# Patient Record
Sex: FEMALE | Race: WHITE | Employment: OTHER | URBAN - NONMETROPOLITAN AREA
[De-identification: names, ages, dates, MRNs, and addresses within clinical notes are randomized per-mention and may not be internally consistent; named-entity substitution may affect disease eponyms.]

---

## 2020-08-18 ENCOUNTER — APPOINTMENT (OUTPATIENT)
Dept: CT IMAGING | Age: 86
End: 2020-08-18
Payer: MEDICARE

## 2020-08-18 ENCOUNTER — HOSPITAL ENCOUNTER (EMERGENCY)
Age: 86
Discharge: HOME | End: 2020-08-18
Attending: FAMILY MEDICINE
Payer: MEDICARE

## 2020-08-18 VITALS
OXYGEN SATURATION: 97 % | SYSTOLIC BLOOD PRESSURE: 154 MMHG | RESPIRATION RATE: 20 BRPM | TEMPERATURE: 97.1 F | WEIGHT: 200 LBS | DIASTOLIC BLOOD PRESSURE: 74 MMHG | HEART RATE: 80 BPM

## 2020-08-18 LAB
ANION GAP: 8 MEQ/L (ref 8–16)
BUN BLDV-MCNC: 19 MG/DL (ref 7–18)
CHLORIDE BLD-SCNC: 102 MEQ/L (ref 98–107)
CO2: 29 MEQ/L (ref 21–32)
CREAT SERPL-MCNC: 0.8 MG/DL (ref 0.6–1.3)
GFR, ESTIMATED: 71 ML/MIN/1.73M2
GLUCOSE BLD-MCNC: 113 MG/DL (ref 74–106)
HCT VFR BLD CALC: 42.9 % (ref 37–47)
HEMOGLOBIN: 14.4 GM/DL (ref 12–16)
MCH RBC QN AUTO: 32.6 PG (ref 27–31)
MCHC RBC AUTO-ENTMCNC: 33.5 GM/DL (ref 33–37)
MCV RBC AUTO: 97.4 FL (ref 81–99)
PDW BLD-RTO: 13.7 % (ref 11.5–14.5)
PLATELET # BLD: 283 THOU/MM3 (ref 130–400)
PMV BLD AUTO: 7.9 FL (ref 7.4–10.4)
POC CALCIUM: 9.2 MG/DL (ref 8.5–10.1)
POTASSIUM SERPL-SCNC: 4.2 MEQ/L (ref 3.5–5.1)
RBC # BLD: 4.41 MILL/MM3 (ref 4.2–5.4)
SODIUM BLD-SCNC: 139 MEQ/L (ref 136–145)
WBC # BLD: 8.9 THOU/MM3 (ref 4.8–10.8)

## 2020-08-18 PROCEDURE — 2709999900 HC NON-CHARGEABLE SUPPLY

## 2020-08-18 PROCEDURE — 90471 IMMUNIZATION ADMIN: CPT | Performed by: FAMILY MEDICINE

## 2020-08-18 PROCEDURE — 85027 COMPLETE CBC AUTOMATED: CPT

## 2020-08-18 PROCEDURE — 90715 TDAP VACCINE 7 YRS/> IM: CPT | Performed by: FAMILY MEDICINE

## 2020-08-18 PROCEDURE — 72125 CT NECK SPINE W/O DYE: CPT

## 2020-08-18 PROCEDURE — 99284 EMERGENCY DEPT VISIT MOD MDM: CPT

## 2020-08-18 PROCEDURE — 80048 BASIC METABOLIC PNL TOTAL CA: CPT

## 2020-08-18 PROCEDURE — 36415 COLL VENOUS BLD VENIPUNCTURE: CPT

## 2020-08-18 PROCEDURE — 6360000002 HC RX W HCPCS: Performed by: FAMILY MEDICINE

## 2020-08-18 PROCEDURE — 12011 RPR F/E/E/N/L/M 2.5 CM/<: CPT

## 2020-08-18 PROCEDURE — 70450 CT HEAD/BRAIN W/O DYE: CPT

## 2020-08-18 PROCEDURE — 99283 EMERGENCY DEPT VISIT LOW MDM: CPT

## 2020-08-18 PROCEDURE — 70486 CT MAXILLOFACIAL W/O DYE: CPT

## 2020-08-18 RX ORDER — LISINOPRIL AND HYDROCHLOROTHIAZIDE 12.5; 1 MG/1; MG/1
1 TABLET ORAL DAILY
COMMUNITY

## 2020-08-18 RX ORDER — SIMVASTATIN 20 MG
20 TABLET ORAL NIGHTLY
COMMUNITY

## 2020-08-18 RX ORDER — LIDOCAINE HYDROCHLORIDE 10 MG/ML
INJECTION, SOLUTION EPIDURAL; INFILTRATION; INTRACAUDAL; PERINEURAL
Status: DISCONTINUED
Start: 2020-08-18 | End: 2020-08-18 | Stop reason: HOSPADM

## 2020-08-18 RX ADMIN — TETANUS TOXOID, REDUCED DIPHTHERIA TOXOID AND ACELLULAR PERTUSSIS VACCINE, ADSORBED 0.5 ML: 5; 2.5; 8; 8; 2.5 SUSPENSION INTRAMUSCULAR at 14:34

## 2020-08-18 SDOH — HEALTH STABILITY: MENTAL HEALTH: HOW OFTEN DO YOU HAVE A DRINK CONTAINING ALCOHOL?: NEVER

## 2020-08-18 ASSESSMENT — ENCOUNTER SYMPTOMS
NAUSEA: 0
ABDOMINAL PAIN: 0
FACIAL SWELLING: 1
VOMITING: 0
SHORTNESS OF BREATH: 0

## 2020-08-18 NOTE — ED PROVIDER NOTES
[facility]  eMERGENCY dEPARTMENT eNCOUnter          CHIEF COMPLAINT       Chief Complaint   Patient presents with    Fall       Nurses Notes reviewed and I agree except as noted in the HPI. HISTORY OF PRESENT ILLNESS    Layman Simmonds Vesta Lyons is a 81 y.o. female who presents after falling at home    Location/Symptom: Ozzie Feldman at home  Timing/Onset: 8/18/2020  Context/Setting: She has difficult time ambulating  Was walking at home and she thinks she was turning and fell to the floor  She did strike her face sustaining a small laceration left nose, some abrasions  She is not on blood thinners. Not on aspirin  Quality: She struck her face had soft to swelling of the right forehead, a left-sided nasal laceration, and some cutaneous bruises  She had abrasions both knees but no significant knee injury  She has chronic knee arthritis  She had no neck pain or LOC  Duration: Over the last few hours  Modifying Factors: She was able to call for help to get up  Severity: 5/10    REVIEW OF SYSTEMS     Review of Systems   Constitutional: Negative for diaphoresis. HENT: Positive for facial swelling. Negative for nosebleeds. Eyes: Negative for visual disturbance. Respiratory: Negative for shortness of breath. Cardiovascular: Negative for chest pain and palpitations. Gastrointestinal: Negative for abdominal pain, nausea and vomiting. Genitourinary: Negative for flank pain. Musculoskeletal: Negative for neck pain. She did scrape both knees. Has chronic knee arthritis right greater than left but does not feel any worse than before the fall    No low back pain   Skin: Positive for wound. Left nasal laceration   Neurological: Negative for speech difficulty, weakness, numbness and headaches. Hematological: Does not bruise/bleed easily. Psychiatric/Behavioral: Negative for confusion. PAST MEDICAL HISTORY    has no past medical history on file.     SURGICAL HISTORY      has no past surgical history on file. CURRENT MEDICATIONS       Previous Medications    LISINOPRIL-HYDROCHLOROTHIAZIDE (PRINZIDE;ZESTORETIC) 10-12.5 MG PER TABLET    Take 1 tablet by mouth daily    SIMVASTATIN (ZOCOR) 20 MG TABLET    Take 20 mg by mouth nightly       ALLERGIES     has No Known Allergies. FAMILY HISTORY     has no family status information on file. family history is not on file. SOCIAL HISTORY      reports that she has never smoked. She has never used smokeless tobacco. She reports that she does not drink alcohol. PHYSICAL EXAM     INITIAL VITALS:  weight is 200 lb (90.7 kg). Her temporal temperature is 97.1 °F (36.2 °C). Her blood pressure is 154/74 (abnormal) and her pulse is 80. Her respiration is 20 and oxygen saturation is 97%. Physical Exam  Vitals signs and nursing note reviewed. Constitutional:       Comments: GCS 15   HENT:      Head: Normocephalic. Comments: Slight soft to swelling right forehead medial eyebrow region    There is a laceration of the left lateral nasal bone 1 cm    Nose is normally aligned    Mouth lips normal  Eyes:      Extraocular Movements: Extraocular movements intact. Pupils: Pupils are equal, round, and reactive to light. Neck:      Musculoskeletal: Normal range of motion and neck supple. No muscular tenderness. Comments: Not tender to palpate the neck  Cardiovascular:      Rate and Rhythm: Normal rate and regular rhythm. Pulses: Normal pulses. Heart sounds: Normal heart sounds. Pulmonary:      Effort: Pulmonary effort is normal.      Breath sounds: Normal breath sounds. Comments: Not tender over the sternum clavicles or ribs  Chest:      Chest wall: No tenderness. Abdominal:      Palpations: Abdomen is soft. Tenderness: There is no abdominal tenderness. There is no guarding or rebound.    Musculoskeletal:      Comments: She has some abrasions both knees but no direct tenderness no soft tissue swelling or effusions    No tenderness of the upper extremities to palpate     Skin:     General: Skin is warm and dry. Comments: 1 cm laceration over the left nasal bone region. Some very superficial epidermal abrasions both knees    Few bruises on the right arm diffusely with no tenderness     Neurological:      General: No focal deficit present. Mental Status: She is alert. Motor: No weakness. Psychiatric:         Mood and Affect: Mood normal.         Behavior: Behavior normal.             DIFFERENTIAL DIAGNOSIS:     Fall with facial contusions evaluate for fracture    Left face/nasal laceration        DIAGNOSTIC RESULTS         RADIOLOGY: non-plain film images(s) such as CT, Ultrasound and MRI are read by the radiologist.  The patient had a multiple view CT scan of the cervical spine which demonstrates  degenerative changes but no acute fracture. CT scan of the facial bones showed nondisplaced right nasal fracture. Soft tissue swelling but no other acute process    CT of the head showed no acute intracranial bleeding or skull fracture.       [x] Visualized and interpreted by me   [x] Radiologist's Wet Read Report Reviewed   [] Discussed with Radiologist.    LABS:   Labs Reviewed   CBC - Abnormal; Notable for the following components:       Result Value    MCH 32.6 (*)     All other components within normal limits   BASIC METABOLIC PANEL - Abnormal; Notable for the following components:    Glucose 113 (*)     BUN 19 (*)     All other components within normal limits   GLOMERULAR FILTRATION RATE, ESTIMATED - Abnormal; Notable for the following components:    GFR, Estimated 71 (*)     All other components within normal limits   ANION GAP       EMERGENCY DEPARTMENT COURSE:   Vitals:    Vitals:    08/18/20 1234 08/18/20 1408   BP: (!) 154/74    Pulse: 81 80   Resp: 22 20   Temp: 97.1 °F (36.2 °C)    TempSrc: Temporal    SpO2: 98% 97%   Weight: 200 lb (90.7 kg)      Nursing notes reviewed    Nasal laceration repaired with 5-0 nylon 2 interrupted sutures    Normal CBC BNP renal function    CT scan shows a right nasal fracture. The laceration is on the left I do not believe this is open    Boostrix IM    May use Tylenol for pain    Straight leg raising exercises demonstrated to help with possibly rebuilding some muscle strength    Discharge home      CRITICAL CARE:   none    CONSULTS:  none    PROCEDURES:    Lac Repair    Date/Time: 8/18/2020 2:43 PM  Performed by: Emily Stringer MD  Authorized by: Emily Stringer MD     Consent:     Consent obtained:  Verbal    Consent given by:  Patient  Anesthesia (see MAR for exact dosages): Anesthesia method:  Local infiltration    Local anesthetic:  Lidocaine 1% w/o epi  Laceration details:     Location:  Face    Face location:  Nose    Length (cm):  1  Repair type:     Repair type:  Simple  Pre-procedure details:     Preparation:  Patient was prepped and draped in usual sterile fashion  Exploration:     Hemostasis achieved with:  Direct pressure    Wound exploration: entire depth of wound probed and visualized      Wound extent: no foreign bodies/material noted      Contaminated: no    Treatment:     Area cleansed with:  Shur-Clens    Amount of cleaning:  Standard    Visualized foreign bodies/material removed: no    Skin repair:     Repair method:  Sutures    Suture size:  5-0    Suture material:  Nylon    Number of sutures:  2  Approximation:     Approximation:  Close  Post-procedure details:     Dressing:  Open (no dressing)    Patient tolerance of procedure: Tolerated well, no immediate complications        FINAL IMPRESSION      1. Fall, initial encounter    2. Closed fracture of nasal bone, initial encounter    3. Facial laceration, initial encounter    4.  Closed head injury, initial encounter          DISPOSITION/PLAN     Low level activity    Stitches out in about 5 days with her primary care    Tylenol for pain    PATIENT REFERRED TO:  Artem Sun  [address] [place] [address] [address] [address]  [phone]    In 5 days  For suture removal and for recheck for the fall      DISCHARGE MEDICATIONS:  New Prescriptions    No medications on file       (Please note that portions of this note were completed with a voice recognition program.  Efforts were made to edit the dictations but occasionally words are mis-transcribed.)    MD Emily Hung MD  08/18/20 8475

## 2025-03-06 ENCOUNTER — HOSPITAL ENCOUNTER (EMERGENCY)
Age: 89
Discharge: HOME | End: 2025-03-06
Attending: EMERGENCY MEDICINE
Payer: MEDICARE

## 2025-03-06 ENCOUNTER — APPOINTMENT (OUTPATIENT)
Dept: CT IMAGING | Age: 89
End: 2025-03-06
Payer: MEDICARE

## 2025-03-06 VITALS
TEMPERATURE: 97.8 F | WEIGHT: 150 LBS | HEART RATE: 69 BPM | OXYGEN SATURATION: 94 % | RESPIRATION RATE: 20 BRPM | DIASTOLIC BLOOD PRESSURE: 85 MMHG | SYSTOLIC BLOOD PRESSURE: 162 MMHG

## 2025-03-06 DIAGNOSIS — H53.9 VISION CHANGES: Primary | ICD-10-CM

## 2025-03-06 LAB
ALBUMIN SERPL BCG-MCNC: 3.8 G/DL (ref 3.4–4.9)
ALP SERPL-CCNC: 71 U/L (ref 35–104)
ALT SERPL W/O P-5'-P-CCNC: 11 U/L (ref 10–35)
ANION GAP SERPL CALC-SCNC: 10 MEQ/L (ref 8–16)
APTT PPP: 28.3 SECONDS (ref 22–38)
AST SERPL-CCNC: 19 U/L (ref 10–35)
BASOPHILS ABSOLUTE: 0.1 THOU/MM3 (ref 0–0.1)
BASOPHILS NFR BLD AUTO: 0.8 %
BILIRUB SERPL-MCNC: 0.4 MG/DL (ref 0.3–1.2)
BUN SERPL-MCNC: 14 MG/DL (ref 8–23)
CALCIUM SERPL-MCNC: 9.1 MG/DL (ref 8.2–9.6)
CHLORIDE SERPL-SCNC: 99 MEQ/L (ref 98–111)
CO2 SERPL-SCNC: 26 MEQ/L (ref 22–29)
CREAT SERPL-MCNC: 0.8 MG/DL (ref 0.5–0.9)
DEPRECATED RDW RBC AUTO: 48.3 FL (ref 35–45)
EKG ATRIAL RATE: 69 BPM
EKG P AXIS: 3 DEGREES
EKG P-R INTERVAL: 192 MS
EKG Q-T INTERVAL: 396 MS
EKG QRS DURATION: 88 MS
EKG QTC CALCULATION (BAZETT): 424 MS
EKG R AXIS: 31 DEGREES
EKG T AXIS: 9 DEGREES
EKG VENTRICULAR RATE: 69 BPM
EOSINOPHIL NFR BLD AUTO: 1.5 %
EOSINOPHILS ABSOLUTE: 0.1 THOU/MM3 (ref 0–0.4)
ERYTHROCYTE [DISTWIDTH] IN BLOOD BY AUTOMATED COUNT: 13.7 % (ref 11.5–14.5)
GFR SERPL CREATININE-BSD FRML MDRD: 67 ML/MIN/1.73M2
GLUCOSE SERPL-MCNC: 96 MG/DL (ref 74–109)
HCT VFR BLD AUTO: 40.2 % (ref 37–47)
HGB BLD-MCNC: 13.6 GM/DL (ref 12–16)
IMM GRANULOCYTES # BLD AUTO: 0.01 THOU/MM3 (ref 0–0.07)
IMM GRANULOCYTES NFR BLD AUTO: 0.1 %
INR PPP: 0.94 (ref 0.85–1.13)
LYMPHOCYTES ABSOLUTE: 1.5 THOU/MM3 (ref 1–4.8)
LYMPHOCYTES NFR BLD AUTO: 21.2 %
MCH RBC QN AUTO: 32.3 PG (ref 26–33)
MCHC RBC AUTO-ENTMCNC: 33.8 GM/DL (ref 32.2–35.5)
MCV RBC AUTO: 95.5 FL (ref 81–99)
MONOCYTES ABSOLUTE: 0.4 THOU/MM3 (ref 0.4–1.3)
MONOCYTES NFR BLD AUTO: 6.3 %
NEUTROPHILS ABSOLUTE: 5 THOU/MM3 (ref 1.8–7.7)
NEUTROPHILS NFR BLD AUTO: 70.1 %
NRBC BLD AUTO-RTO: 0 /100 WBC
NT-PROBNP SERPL IA-MCNC: 158 PG/ML (ref 0–449)
OSMOLALITY SERPL CALC.SUM OF ELEC: 270.4 MOSMOL/KG (ref 275–300)
PLATELET # BLD AUTO: 318 THOU/MM3 (ref 130–400)
PMV BLD AUTO: 10 FL (ref 9.4–12.4)
POTASSIUM SERPL-SCNC: 4.4 MEQ/L (ref 3.5–5.2)
PROT SERPL-MCNC: 7 G/DL (ref 6.4–8.3)
RBC # BLD AUTO: 4.21 MILL/MM3 (ref 4.2–5.4)
SODIUM SERPL-SCNC: 135 MEQ/L (ref 135–145)
TROPONIN, HIGH SENSITIVITY: 11 NG/L (ref 0–12)
WBC # BLD AUTO: 7.1 THOU/MM3 (ref 4.8–10.8)

## 2025-03-06 PROCEDURE — 85610 PROTHROMBIN TIME: CPT

## 2025-03-06 PROCEDURE — 84484 ASSAY OF TROPONIN QUANT: CPT

## 2025-03-06 PROCEDURE — 36415 COLL VENOUS BLD VENIPUNCTURE: CPT

## 2025-03-06 PROCEDURE — 70450 CT HEAD/BRAIN W/O DYE: CPT

## 2025-03-06 PROCEDURE — 85025 COMPLETE CBC W/AUTO DIFF WBC: CPT

## 2025-03-06 PROCEDURE — 85730 THROMBOPLASTIN TIME PARTIAL: CPT

## 2025-03-06 PROCEDURE — 80053 COMPREHEN METABOLIC PANEL: CPT

## 2025-03-06 PROCEDURE — 99284 EMERGENCY DEPT VISIT MOD MDM: CPT

## 2025-03-06 PROCEDURE — 83880 ASSAY OF NATRIURETIC PEPTIDE: CPT

## 2025-03-06 PROCEDURE — 93005 ELECTROCARDIOGRAM TRACING: CPT | Performed by: EMERGENCY MEDICINE

## 2025-03-06 NOTE — DISCHARGE INSTRUCTIONS
You were seen and evaluated in the emergency department today for changes to vision.  Your head CT and laboratory results were stable.    Follow-up with [facility] eye care tomorrow 3/7/2025 at 815.

## 2025-03-06 NOTE — ED NOTES
Patient presents to the Emergency Department via EMS from home by self. Patient presents with a complaint of bilateral vision changes w hx of dilation last week bilateral. Patient states that vision is continually getting worse at this time since procedure. EKG obtained and IV inserted. Patient is alert and oriented x4, patient does not appear in acute distress upon triage. Patient respirations are regular and unlabored with even rise and fall of chest. BS in route 184 per EMS. Patient family at the bedside. Call light within reach. Dr. Petersen at bedside at this time.

## 2025-03-06 NOTE — ED PROVIDER NOTES
[facility] EMERGENCY DEPARTMENT  EMERGENCY DEPARTMENT ENCOUNTER          Pt Name: Nancy Heath  MRN: [id number]  Birthdate 12/5/1927  Date of evaluation: 3/6/2025  Resident Physician: Javi Petersen MD EM Resident PGY-3  Attending Physician: Landy Zavala MD      CHIEF COMPLAINT       Chief Complaint   Patient presents with    Loss of Vision         HISTORY OF PRESENT ILLNESS    HPI  Nancy Heath is a 89 y.o. female who presents to the emergency department from home, brought in by EMS for evaluation of vision changes.    Patient states that she has had vision changes chronically over the past few weeks she occasionally sees blurriness of vision, flashes in vision, black spots.  She was seen by [facility] eye care last week who said there was no detachment of her retina and started her on eyedrops for dryness.  Patient states she has been using this 3 times daily and since that appointment she has had worsening of her vision.  She denies any loss of vision.  All of her symptoms are transient.  She denies a headache currently.      The patient has no other acute complaints at this time.    ROS negative except as stated above.    PAST MEDICAL AND SURGICAL HISTORY   History reviewed. No pertinent past medical history.  History reviewed. No pertinent surgical history.      MEDICATIONS   No current facility-administered medications for this encounter.    Current Outpatient Medications:     lisinopril-hydroCHLOROthiazide (PRINZIDE;ZESTORETIC) 10-12.5 MG per tablet, Take 1 tablet by mouth daily Patient reports only half a pill, Disp: , Rfl:     simvastatin (ZOCOR) 20 MG tablet, Take 20 mg by mouth nightly, Disp: , Rfl:     Previous Medications    LISINOPRIL-HYDROCHLOROTHIAZIDE (PRINZIDE;ZESTORETIC) 10-12.5 MG PER TABLET    Take 1 tablet by mouth daily Patient reports only half a pill    SIMVASTATIN (ZOCOR) 20 MG TABLET    Take 20 mg by mouth nightly         SOCIAL HISTORY     Social History     Social

## 2025-03-06 NOTE — ED PROVIDER NOTES
ATTENDING NOTE:    I supervised and discussed the history, physical exam and the management of this patient with the resident. I reviewed the resident's note and agree with the documented findings and plan of care.  Please see my additional note.    I personally saw and examined the patient.  I have reviewed and agree with the resident's findings, including all diagnostic interpretations and treatment plans as written.  I was present for the key portion of any procedures performed and the inclusive time noted in any critical care statement.    Electronically verified by Landy López MD  03/06/25 6778     Subjective:      Patient ID: Soraida Arredondo is a 57 y.o. female.    Vitals:  vitals were not taken for this visit.     Chief Complaint: URI    58 YO Female chief complaint present with URI. Has been going on x 2 days. Coricidin Cold flu tablet at night. Patient states she feel fatigue.    URI   This is a new problem. The current episode started in the past 7 days. The problem has been gradually worsening. There has been no fever. Associated symptoms include congestion, coughing, headaches, sinus pain, sneezing and a sore throat. She has tried NSAIDs and decongestant (cold flu coricidin tablets,) for the symptoms. The treatment provided no relief.   HENT:  Positive for congestion, sinus pain and sore throat.    Respiratory:  Positive for cough.    Allergic/Immunologic: Positive for sneezing.   Neurological:  Positive for headaches.    Objective:     Physical Exam   Constitutional: She is oriented to person, place, and time. No distress. normal  HENT:   Head: Normocephalic and atraumatic.   Ears:   Right Ear: Tympanic membrane, external ear and ear canal normal.   Left Ear: Tympanic membrane, external ear and ear canal normal.   Nose: Nose normal. No rhinorrhea or congestion.   Mouth/Throat: Mucous membranes are dry. Posterior oropharyngeal erythema present. Oropharynx is clear.   Eyes: Conjunctivae are normal. Pupils are equal, round, and reactive to light. Extraocular movement intact   Neck: Neck supple.   Cardiovascular: Normal rate, regular rhythm, normal heart sounds and normal pulses.   No murmur heard.  Pulmonary/Chest: Effort normal and breath sounds normal. No respiratory distress.   Abdominal: Normal appearance and bowel sounds are normal. Soft. flat abdomen   Neurological: no focal deficit. She is alert and oriented to person, place, and time.   Skin: Skin is warm and dry. Capillary refill takes less than 2 seconds. jaundice  Psychiatric: Her behavior is normal. Mood, judgment and thought content normal.    Nursing note and vitals reviewed.    Assessment:     Plan:   1. Fever, unspecified fever cause  - POCT Influenza A/B MOLECULAR  - SARS Coronavirus 2 Antigen, POCT Manual Read    2. Rhinitis, unspecified type  - ipratropium (ATROVENT) 21 mcg (0.03 %) nasal spray; 2 sprays by Each Nostril route every 12 (twelve) hours as needed for Rhinitis.  Dispense: 30 mL; Refill: 0    3. Cough, unspecified type  - promethazine-codeine 6.25-10 mg/5 ml (PHENERGAN WITH CODEINE) 6.25-10 mg/5 mL syrup; Take 5 mLs by mouth every 4 (four) hours as needed for Cough.  Dispense: 240 mL; Refill: 0   4. Covid positive all results discussed with pt.

## 2025-03-19 ENCOUNTER — TELEPHONE (OUTPATIENT)
Dept: FAMILY MEDICINE CLINIC | Age: 89
End: 2025-03-19

## 2025-03-20 ENCOUNTER — HOSPITAL ENCOUNTER (EMERGENCY)
Age: 89
Discharge: HOME | End: 2025-03-20
Attending: EMERGENCY MEDICINE
Payer: MEDICARE

## 2025-03-20 ENCOUNTER — APPOINTMENT (OUTPATIENT)
Dept: CT IMAGING | Age: 89
End: 2025-03-20
Payer: MEDICARE

## 2025-03-20 VITALS
OXYGEN SATURATION: 94 % | BODY MASS INDEX: 27.6 KG/M2 | DIASTOLIC BLOOD PRESSURE: 74 MMHG | HEART RATE: 72 BPM | RESPIRATION RATE: 16 BRPM | SYSTOLIC BLOOD PRESSURE: 161 MMHG | TEMPERATURE: 97.3 F | WEIGHT: 150 LBS | HEIGHT: 62 IN

## 2025-03-20 DIAGNOSIS — K59.00 CONSTIPATION, UNSPECIFIED CONSTIPATION TYPE: Primary | ICD-10-CM

## 2025-03-20 DIAGNOSIS — K62.89 PROCTITIS: ICD-10-CM

## 2025-03-20 PROCEDURE — 99284 EMERGENCY DEPT VISIT MOD MDM: CPT

## 2025-03-20 PROCEDURE — 74176 CT ABD & PELVIS W/O CONTRAST: CPT

## 2025-03-20 RX ORDER — SODIUM PHOSPHATE, DIBASIC AND SODIUM PHOSPHATE, MONOBASIC 7; 19 G/230ML; G/230ML
1 ENEMA RECTAL ONCE
Status: DISCONTINUED | OUTPATIENT
Start: 2025-03-20 | End: 2025-03-20 | Stop reason: HOSPADM

## 2025-03-20 ASSESSMENT — PAIN - FUNCTIONAL ASSESSMENT: PAIN_FUNCTIONAL_ASSESSMENT: NONE - DENIES PAIN

## 2025-03-20 NOTE — ED NOTES
Pt stable and off to Radiology via ED cart with [employer] tech. Pt states no concerns and vitals stable.

## 2025-03-20 NOTE — TELEPHONE ENCOUNTER
Received fall from POA that wanted pts labs reviewed from [facility].  [facility] labs reviewed and discussed with pts POA.   All questions answered.

## 2025-03-20 NOTE — ED PROVIDER NOTES
[facility]  eMERGENCY dEPARTMENT eNCOUnter             [facility] CARE [place]    CHIEF COMPLAINT    Chief Complaint   Patient presents with    Constipation       Nurses Notes reviewed and I agree except as noted in the HPI.    HPI    Nancy Heath is a 89 y.o. female who presents with her daughter, stating that she has not had much bowel movement for about a week.  She has been undergoing frequent treatments in [place], [place] for sudden vision loss, thought to be due to a viral infection.  Traveling back and forth has been very hard on her, and she has not had her regular schedule of eating, drinking, or having bowel movements.  She has tried a few doses of MiraLAX and stool softener without relief.  She feels fullness in her rectal area and some lower abdominal discomfort.  No nausea or vomiting, some decrease in appetite.  She is drinking fluids.    REVIEW OF SYSTEMS      Review of Systems   Constitutional:  Positive for malaise/fatigue. Negative for fever.   HENT:  Negative for congestion and sore throat.    Respiratory:  Negative for cough and shortness of breath.    Cardiovascular:  Negative for chest pain and palpitations.   Gastrointestinal:  Negative for blood in stool and nausea.   Genitourinary:  Negative for dysuria.   All other systems reviewed and are negative.        PAST MEDICAL HISTORY     has no past medical history on file.    SURGICAL HISTORY     has no past surgical history on file.    CURRENT MEDICATIONS    Discharge Medication List as of 3/20/2025  9:13 PM        CONTINUE these medications which have NOT CHANGED    Details   lisinopril-hydroCHLOROthiazide (PRINZIDE;ZESTORETIC) 10-12.5 MG per tablet Take 1 tablet by mouth daily Patient reports only half a pillHistorical Med      simvastatin (ZOCOR) 20 MG tablet Take 20 mg by mouth nightlyHistorical Med         Valtrex and several kinds of eyedrops    ALLERGIES    is allergic to sulfa antibiotics and

## 2025-03-20 NOTE — ED NOTES
Pt. Presents to ED via private auto accompanied per children with c/o constipation.  Last BM 7 days ago.  PT. Denies any pain, no nausea, no vomiting, no diarrhea.  Pt. Taken to exam 5.

## 2025-03-21 ASSESSMENT — ENCOUNTER SYMPTOMS
SORE THROAT: 0
COUGH: 0
BLOOD IN STOOL: 0
NAUSEA: 0
SHORTNESS OF BREATH: 0

## 2025-03-21 NOTE — ED NOTES
Pt sitting comfortable on the bed pan after fleets instilled at this time. Pt family member at bedside. Call light in reach. Will continue to monitor. Dr. Bainbridge aware.

## 2025-03-21 NOTE — DISCHARGE INSTRUCTIONS
MiraLax one capful in 12 ounces of water or juice daily until bowel movements are soft and easy to pass.     Dulcolax suppository once a day as needed to have a bowel movement.     If you have rectal irritation, use may get over the counter Anusol HC suppositories or cream and use one dose rectally twice a day for up to a week to reduce inflammation.     Increase fluids and fiber in your diet.     Follow up with your doctor.

## 2025-03-21 NOTE — ED NOTES
Pt placed on commode at this time. Pt had small BM and safely transferred back to the bed. Pt family at bedside.

## 2025-03-21 NOTE — ED NOTES
Pt removed from Bedpan at this time. Pt cleaned and chucks pads replaced. 150ml of urine emptied out of bedpan at this time. No BM produced. Dr. Bainbridge aware. Pt family at bedside. Call light in reach. Pt sts she does not feel like she has to have a BM anymore.